# Patient Record
Sex: FEMALE | Race: WHITE | Employment: FULL TIME | URBAN - METROPOLITAN AREA
[De-identification: names, ages, dates, MRNs, and addresses within clinical notes are randomized per-mention and may not be internally consistent; named-entity substitution may affect disease eponyms.]

---

## 2019-10-10 ENCOUNTER — HOSPITAL ENCOUNTER (OUTPATIENT)
Dept: MAMMOGRAPHY | Age: 39
Discharge: HOME | End: 2019-10-10
Payer: COMMERCIAL

## 2019-10-10 DIAGNOSIS — Z12.31 SCREENING MAMMOGRAM, ENCOUNTER FOR: ICD-10-CM

## 2019-10-10 PROCEDURE — 77063 BREAST TOMOSYNTHESIS BI: CPT

## 2020-12-29 ENCOUNTER — HOSPITAL ENCOUNTER (OUTPATIENT)
Dept: MAMMOGRAPHY | Age: 40
Discharge: HOME | End: 2020-12-29
Payer: COMMERCIAL

## 2020-12-29 PROCEDURE — 77063 BREAST TOMOSYNTHESIS BI: CPT

## 2021-01-12 ENCOUNTER — HOSPITAL ENCOUNTER (OUTPATIENT)
Dept: WOMENS IMAGING | Age: 41
Discharge: HOME | End: 2021-01-12
Payer: COMMERCIAL

## 2021-01-12 DIAGNOSIS — R92.8 ABNORMAL MAMMOGRAM: ICD-10-CM

## 2021-01-12 PROCEDURE — 77065 DX MAMMO INCL CAD UNI: CPT

## 2021-01-12 PROCEDURE — 76642 ULTRASOUND BREAST LIMITED: CPT

## 2022-01-06 ENCOUNTER — HOSPITAL ENCOUNTER (OUTPATIENT)
Dept: MAMMOGRAPHY | Age: 42
Discharge: HOME | End: 2022-01-06
Payer: COMMERCIAL

## 2022-01-06 DIAGNOSIS — Z12.31 BREAST CANCER SCREENING BY MAMMOGRAM: ICD-10-CM

## 2022-01-06 PROCEDURE — 77063 BREAST TOMOSYNTHESIS BI: CPT

## 2022-01-27 ENCOUNTER — HOSPITAL ENCOUNTER (OUTPATIENT)
Dept: WOMENS IMAGING | Age: 42
Discharge: HOME | End: 2022-01-27
Payer: COMMERCIAL

## 2022-01-27 DIAGNOSIS — R92.8 ABNORMAL MAMMOGRAM: ICD-10-CM

## 2022-01-27 PROCEDURE — 76642 ULTRASOUND BREAST LIMITED: CPT

## 2022-01-27 PROCEDURE — G0279 TOMOSYNTHESIS, MAMMO: HCPCS

## 2022-02-01 ENCOUNTER — OFFICE VISIT (OUTPATIENT)
Dept: OBGYN CLINIC | Age: 42
End: 2022-02-01
Payer: COMMERCIAL

## 2022-02-01 VITALS
HEART RATE: 66 BPM | DIASTOLIC BLOOD PRESSURE: 70 MMHG | SYSTOLIC BLOOD PRESSURE: 106 MMHG | WEIGHT: 214 LBS | TEMPERATURE: 97.8 F

## 2022-02-01 DIAGNOSIS — Z12.4 PAP SMEAR FOR CERVICAL CANCER SCREENING: ICD-10-CM

## 2022-02-01 DIAGNOSIS — N92.6 IRREGULAR BLEEDING: ICD-10-CM

## 2022-02-01 DIAGNOSIS — Z01.419 ENCOUNTER FOR ANNUAL ROUTINE GYNECOLOGICAL EXAMINATION: Primary | ICD-10-CM

## 2022-02-01 LAB
BASOPHILS ABSOLUTE: 0 K/UL (ref 0–0.2)
BASOPHILS RELATIVE PERCENT: 0.3 %
EOSINOPHILS ABSOLUTE: 0.1 K/UL (ref 0–0.6)
EOSINOPHILS RELATIVE PERCENT: 1.3 %
HCT VFR BLD CALC: 41.9 % (ref 36–48)
HEMOGLOBIN: 14.1 G/DL (ref 12–16)
LYMPHOCYTES ABSOLUTE: 2.6 K/UL (ref 1–5.1)
LYMPHOCYTES RELATIVE PERCENT: 40.7 %
MCH RBC QN AUTO: 29.9 PG (ref 26–34)
MCHC RBC AUTO-ENTMCNC: 33.6 G/DL (ref 31–36)
MCV RBC AUTO: 89 FL (ref 80–100)
MONOCYTES ABSOLUTE: 0.4 K/UL (ref 0–1.3)
MONOCYTES RELATIVE PERCENT: 6.8 %
NEUTROPHILS ABSOLUTE: 3.2 K/UL (ref 1.7–7.7)
NEUTROPHILS RELATIVE PERCENT: 50.9 %
PDW BLD-RTO: 13.5 % (ref 12.4–15.4)
PLATELET # BLD: 192 K/UL (ref 135–450)
PMV BLD AUTO: 9.8 FL (ref 5–10.5)
RBC # BLD: 4.71 M/UL (ref 4–5.2)
WBC # BLD: 6.4 K/UL (ref 4–11)

## 2022-02-01 PROCEDURE — G8484 FLU IMMUNIZE NO ADMIN: HCPCS | Performed by: OBSTETRICS & GYNECOLOGY

## 2022-02-01 PROCEDURE — 99386 PREV VISIT NEW AGE 40-64: CPT | Performed by: OBSTETRICS & GYNECOLOGY

## 2022-02-01 PROCEDURE — 36415 COLL VENOUS BLD VENIPUNCTURE: CPT | Performed by: OBSTETRICS & GYNECOLOGY

## 2022-02-01 RX ORDER — LEVOTHYROXINE SODIUM 175 UG/1
175 TABLET ORAL DAILY
COMMUNITY

## 2022-02-01 RX ORDER — M-VIT,TX,IRON,MINS/CALC/FOLIC 27MG-0.4MG
1 TABLET ORAL DAILY
COMMUNITY

## 2022-02-01 RX ORDER — B-COMPLEX WITH VITAMIN C
TABLET ORAL DAILY
COMMUNITY

## 2022-02-01 RX ORDER — ESCITALOPRAM OXALATE 5 MG/1
5 TABLET ORAL DAILY
COMMUNITY

## 2022-02-01 RX ORDER — ASCORBIC ACID 500 MG
500 TABLET ORAL DAILY
COMMUNITY

## 2022-02-01 RX ORDER — FENOFIBRATE 48 MG/1
48 TABLET, COATED ORAL DAILY
COMMUNITY

## 2022-02-01 RX ORDER — CHLORAL HYDRATE 500 MG
3000 CAPSULE ORAL 3 TIMES DAILY
COMMUNITY

## 2022-02-01 ASSESSMENT — ENCOUNTER SYMPTOMS
CONSTIPATION: 0
SHORTNESS OF BREATH: 0
ABDOMINAL PAIN: 0
VOMITING: 0
DIARRHEA: 0
NAUSEA: 0

## 2022-02-01 NOTE — PROGRESS NOTES
Annual Exam      CC:   Chief Complaint   Patient presents with    New Patient       HPI:  35 y.o. [id number] presents for her gynecologic annual exam.     Has been having some issues with her periods. Over the last few months started having occasional hot flashes, and after that started noticing her periods were a little off. Had a period in November, December had them twice, no period in January, getting ready to start one now she thinks (breast tenderness). No vaginal dryness, no dyspareunia. Prior to this periods were pretty consistent once/month. Unsure when mother and older sister went through menopause, thinks maybe around 48 for mom. Patient seen and examined. Screening:  Last pap smear: 3/2019 NILM  Mammogram: 2022, birads 2 (had US)  Colonoscopy: n/a  DEXA scan: n/a    Review of Systems:   Review of Systems   Constitutional: Positive for diaphoresis. Negative for chills and fever. Respiratory: Negative for shortness of breath. Cardiovascular: Negative for chest pain. Gastrointestinal: Negative for abdominal pain, constipation, diarrhea, nausea and vomiting. Genitourinary: Positive for menstrual problem. Negative for difficulty urinating, dysuria, vaginal bleeding and vaginal discharge. Neurological: Negative for headaches.        Primary Care Physician: Chuyita Mcwilliams DO    Obstetric History  OB History    Para Term  AB Living   4 2 1 1 2 2   SAB IAB Ectopic Molar Multiple Live Births   2         2      # Outcome Date GA Lbr Srdihar/2nd Weight Sex Delivery Anes PTL Lv   4 2010           3 Term     M  EPI  TRAE   2   34w0d  6 lb (2.722 kg) F Vag-Spont EPI  TRAE   1 1996               Gynecologic History  Menstrual History:   LMP: see above   Menstrual pattern: see above  Sexual History:   Contraception: s/p BTL, vasectomy   Currently is sexually active   Denies history of STIs   No sexual problems  Pap History:   Last pap smear: 3/2019, NILM   History of abnormal pap smears: 1996, mild dysplasia, had colposcopy and normal after that    Medical History:  Past Medical History:   Diagnosis Date    Abnormal Pap smear of cervix     Autoimmune disorder (HCC)     Breast disorder     Menopausal symptoms     Postpartum depression     Psychiatric problem     anxiety    Thyroid disease        Surgical History:  Past Surgical History:   Procedure Laterality Date    DILATION AND CURETTAGE         Medications:  Current Outpatient Medications   Medication Sig Dispense Refill    Multiple Vitamins-Minerals (THERAPEUTIC MULTIVITAMIN-MINERALS) tablet Take 1 tablet by mouth daily      Zinc 100 MG TABS Take by mouth daily      levothyroxine (SYNTHROID) 175 MCG tablet Take 175 mcg by mouth Daily      fenofibrate (TRICOR) 48 MG tablet Take 48 mg by mouth daily      escitalopram (LEXAPRO) 5 MG tablet Take 5 mg by mouth daily      vitamin C (ASCORBIC ACID) 500 MG tablet Take 500 mg by mouth daily      Cholecalciferol (VITAMIN D3) 125 MCG (5000 UT) TABS Take by mouth daily      Omega-3 Fatty Acids (FISH OIL) 1000 MG CAPS Take 3,000 mg by mouth 3 times daily       No current facility-administered medications for this visit. Allergies:   Allergies   Allergen Reactions    Zoloft [Sertraline]        Social History:  Social History     Socioeconomic History    Marital status:      Spouse name: None    Number of children: None    Years of education: None    Highest education level: None   Occupational History    None   Tobacco Use    Smoking status: Current Every Day Smoker     Packs/day: 0.50    Smokeless tobacco: Never Used   Vaping Use    Vaping Use: Never used   Substance and Sexual Activity    Alcohol use: Not Currently    Drug use: Not Currently    Sexual activity: None   Other Topics Concern    None   Social History Narrative    None     Social Determinants of Health     Financial Resource Strain:     Difficulty of Paying Living nipple discharge or skin change. Left: Normal. No mass, nipple discharge or skin change. Abdominal:      General: There is no distension. Palpations: Abdomen is soft. Tenderness: There is no abdominal tenderness. There is no guarding or rebound. Genitourinary:     Comments: Pelvic exam: VULVA: normal appearing vulva with no masses, tenderness or lesions, VAGINA: normal appearing vagina with normal color and discharge, no lesions, CERVIX: normal appearing cervix without discharge or lesions, UTERUS: uterus is normal size, shape, consistency and nontender, ADNEXA: normal adnexa in size, nontender and no masses. Musculoskeletal:      Cervical back: Normal range of motion. Neurological:      Mental Status: She is alert and oriented to person, place, and time. Assessment/Plan:  35 y.o. [id number] presenting for her annual exam:    1. Encounter for annual routine gynecological examination  Discussed age appropriate screening recommendations, pap smear sent today and mammogram up-to-date. Discussed breast self awareness, STI screening deferred. - PAP SMEAR    2. Pap smear for cervical cancer screening  - PAP SMEAR    3. Irregular bleeding  Patient with irregular bleeding as above, discussed possible perimenopausal bleeding with associated symptoms however also will send below labs as patient has history of thyroid disease. Reviewed return precautions, all questions answered.   - CBC Auto Differential  - Estradiol  - Prolactin  - TSH with Reflex  - Follicle Stimulating Hormone      Dispo: PRN or for annual exam  Luke Mcburney, MD

## 2022-02-02 LAB
ESTRADIOL LEVEL: 23 PG/ML
FOLLICLE STIMULATING HORMONE: 6.7 MIU/ML
PROLACTIN: 12.5 NG/ML
T4 FREE: 1.8 NG/DL (ref 0.9–1.8)
TSH REFLEX: 0.13 UIU/ML (ref 0.27–4.2)

## 2022-02-04 LAB
HPV COMMENT: NORMAL
HPV TYPE 16: NOT DETECTED
HPV TYPE 18: NOT DETECTED
HPVOH (OTHER TYPES): NOT DETECTED

## 2023-01-24 ENCOUNTER — HOSPITAL ENCOUNTER (OUTPATIENT)
Dept: MAMMOGRAPHY | Age: 43
Discharge: HOME | End: 2023-01-24
Payer: COMMERCIAL

## 2023-01-24 DIAGNOSIS — Z12.31 BREAST CANCER SCREENING BY MAMMOGRAM: ICD-10-CM

## 2023-01-24 PROCEDURE — 77067 SCR MAMMO BI INCL CAD: CPT

## 2023-04-04 ENCOUNTER — OFFICE VISIT (OUTPATIENT)
Dept: OBGYN CLINIC | Age: 43
End: 2023-04-04
Payer: COMMERCIAL

## 2023-04-04 VITALS
WEIGHT: 184.8 LBS | HEART RATE: 56 BPM | TEMPERATURE: 97.6 F | DIASTOLIC BLOOD PRESSURE: 72 MMHG | SYSTOLIC BLOOD PRESSURE: 114 MMHG

## 2023-04-04 DIAGNOSIS — Z01.419 ENCOUNTER FOR ANNUAL ROUTINE GYNECOLOGICAL EXAMINATION: Primary | ICD-10-CM

## 2023-04-04 DIAGNOSIS — N91.4 SECONDARY OLIGOMENORRHEA: ICD-10-CM

## 2023-04-04 PROCEDURE — 99396 PREV VISIT EST AGE 40-64: CPT | Performed by: OBSTETRICS & GYNECOLOGY

## 2023-04-04 ASSESSMENT — ENCOUNTER SYMPTOMS
NAUSEA: 0
ABDOMINAL PAIN: 0
DIARRHEA: 0
VOMITING: 0
SHORTNESS OF BREATH: 0
CONSTIPATION: 0

## 2023-04-04 NOTE — PROGRESS NOTES
Annual Exam      CC:   Chief Complaint   Patient presents with    Annual Exam       HPI:  38 y.o. [id number] presents for her gynecologic annual exam.    Periods have remained irregular, Dec 29-, then -. Nothing since that time. Did have a medication adjustment to her synthroid about 2 months ago. No menopause symptoms at this time, no hot flashes or dryness. Patient seen and examined. Screening:  Last pap smear: 22 NILM  Mammogram: 23 birads 1  Colonoscopy: n/a  DEXA scan: n/a    Review of Systems:   Review of Systems   Respiratory:  Negative for shortness of breath. Cardiovascular:  Negative for chest pain. Gastrointestinal:  Negative for abdominal pain, constipation, diarrhea, nausea and vomiting. Genitourinary:  Positive for menstrual problem. Negative for difficulty urinating, dysuria, vaginal bleeding and vaginal discharge. Neurological:  Negative for headaches.      Primary Care Physician: Gissell Gonzalez DO    Obstetric History  OB History    Para Term  AB Living   5 3 2 1 2 2   SAB IAB Ectopic Molar Multiple Live Births   2         2      # Outcome Date GA Lbr Sridhar/2nd Weight Sex Delivery Anes PTL Lv   5 2010           4 Term     M  EPI  TRAE   3   34w0d  6 lb (2.722 kg) F Vag-Spont EPI  TRAE   2 SAB            1 Term                Gynecologic History  Menstrual History:  LMP: see above  Menstrual pattern: see above  Sexual History:  Contraception: s/p BTL, vasectomy  Currently is sexually active  Denies history of STIs  No sexual problems  Pap History:  Last pap smear: 22 NILM  History of abnormal pap smears: , mild dysplasia, had colposcopy and normal after that    Medical History:  Past Medical History:   Diagnosis Date    Abnormal Pap smear of cervix     Autoimmune disorder ([facility] [address]     Breast disorder     Menopausal symptoms     Postpartum depression     Psychiatric problem     anxiety    Thyroid disease        Surgical

## 2023-05-08 ENCOUNTER — OFFICE VISIT (OUTPATIENT)
Dept: OBGYN CLINIC | Age: 43
End: 2023-05-08
Payer: COMMERCIAL

## 2023-05-08 VITALS
WEIGHT: 172.2 LBS | HEART RATE: 66 BPM | TEMPERATURE: 99 F | DIASTOLIC BLOOD PRESSURE: 76 MMHG | SYSTOLIC BLOOD PRESSURE: 112 MMHG

## 2023-05-08 DIAGNOSIS — N91.4 SECONDARY OLIGOMENORRHEA: Primary | ICD-10-CM

## 2023-05-08 PROCEDURE — 4004F PT TOBACCO SCREEN RCVD TLK: CPT | Performed by: OBSTETRICS & GYNECOLOGY

## 2023-05-08 PROCEDURE — G8421 BMI NOT CALCULATED: HCPCS | Performed by: OBSTETRICS & GYNECOLOGY

## 2023-05-08 PROCEDURE — 99213 OFFICE O/P EST LOW 20 MIN: CPT | Performed by: OBSTETRICS & GYNECOLOGY

## 2023-05-08 PROCEDURE — G8427 DOCREV CUR MEDS BY ELIG CLIN: HCPCS | Performed by: OBSTETRICS & GYNECOLOGY

## 2023-05-08 NOTE — PROGRESS NOTES
Return Gyn Office Visit    CC:   Chief Complaint   Patient presents with    Follow-up       HPI:  38 y.o. [id number] who presents to office for follow-up of secondary oligomenorrhea. Seen in April 2023 for annual exam, at that time was reporting irregular menses. Labs last year wnl, here for f/u with US. Since last visit did have 2 periods 2 weeks apart, prior to that however had been several months between menses. Objective:  /76 (Site: Left Upper Arm, Position: Sitting, Cuff Size: Medium Adult)   Pulse 66   Temp 99 °F (37.2 °C) (Infrared)   Wt 172 lb 3.2 oz (78.1 kg)   Physical Exam  HENT:      Head: Normocephalic. Cardiovascular:      Rate and Rhythm: Normal rate. Pulmonary:      Effort: Pulmonary effort is normal. No respiratory distress. Abdominal:      Palpations: Abdomen is soft. Tenderness: There is no abdominal tenderness. Neurological:      Mental Status: She is alert. Psychiatric:         Mood and Affect: Mood normal.       Imaging:   US NON OB TRANSVAGINAL  Order: [id number]  Status: Final result     Visible to patient: No (not released)     Next appt: None     Dx: Secondary amenorrhea     0 Result Notes  Details    Reading Physician Reading Date Result Priority   Mitchell Owens MD  [phone] 5/8/2023 Routine     Narrative & Impression  PELVIC ULTRASOUND without DOPPLER INTERROGATION   NON OB     DATE: 05/08/2023     PHYSICIAN: Alexx Jenkins M.D.      SONOGRAPHER: Thao Gomez [facility]     INDICATION: secondary amenorrhea     TYPE OF SCAN: vaginal     FINDINGS:    The cul de sac is normal. No free fluid appreciated. The cervix is normal and not enlarged. Nabothian cyst/s is not noted within the uterine cervix. The uterus measures 8.53 cm x 4.89 cm x 4.00 cm. The uterus is anteverted. The endometrium measures 3.55 mm. The myometrium is homogeneous in appearance. No uterine anomalies are noted. The right ovary is present.   The right ovary measures 4.12 cm x 1.84 cm x 1.64

## 2023-07-21 ENCOUNTER — HOSPITAL ENCOUNTER (OUTPATIENT)
Dept: GENERAL RADIOLOGY | Age: 43
Discharge: HOME | End: 2023-07-21
Payer: COMMERCIAL

## 2023-07-21 DIAGNOSIS — R06.2 WHEEZING: ICD-10-CM

## 2023-07-21 PROCEDURE — 71046 X-RAY EXAM CHEST 2 VIEWS: CPT

## 2024-02-20 ENCOUNTER — HOSPITAL ENCOUNTER (OUTPATIENT)
Dept: MAMMOGRAPHY | Age: 44
Discharge: HOME | End: 2024-02-20
Payer: COMMERCIAL

## 2024-02-20 VITALS — BODY MASS INDEX: 25.11 KG/M2 | HEIGHT: 67 IN | WEIGHT: 160 LBS

## 2024-02-20 DIAGNOSIS — Z12.31 ENCOUNTER FOR SCREENING MAMMOGRAM FOR BREAST CANCER: ICD-10-CM

## 2024-02-20 PROCEDURE — 77063 BREAST TOMOSYNTHESIS BI: CPT

## 2024-12-03 ENCOUNTER — OFFICE VISIT (OUTPATIENT)
Dept: ENDOCRINOLOGY | Age: 44
End: 2024-12-03
Payer: COMMERCIAL

## 2024-12-03 VITALS
BODY MASS INDEX: 29.51 KG/M2 | HEART RATE: 76 BPM | WEIGHT: 188 LBS | SYSTOLIC BLOOD PRESSURE: 121 MMHG | HEIGHT: 67 IN | DIASTOLIC BLOOD PRESSURE: 72 MMHG

## 2024-12-03 DIAGNOSIS — G47.30 SLEEP APNEA IN ADULT: ICD-10-CM

## 2024-12-03 DIAGNOSIS — E03.9 HYPOTHYROIDISM (ACQUIRED): Primary | ICD-10-CM

## 2024-12-03 DIAGNOSIS — K58.8 OTHER IRRITABLE BOWEL SYNDROME: ICD-10-CM

## 2024-12-03 DIAGNOSIS — M79.89 BILATERAL HAND SWELLING: ICD-10-CM

## 2024-12-03 DIAGNOSIS — E03.9 HYPOTHYROIDISM (ACQUIRED): ICD-10-CM

## 2024-12-03 LAB
IGA SERPL-MCNC: 205 MG/DL (ref 70–400)
T4 FREE SERPL-MCNC: 1.2 NG/DL (ref 0.9–1.8)
THYROPEROXIDASE AB SERPL IA-ACNC: 32 IU/ML
TSH SERPL DL<=0.005 MIU/L-ACNC: 9.29 UIU/ML (ref 0.27–4.2)

## 2024-12-03 PROCEDURE — G8427 DOCREV CUR MEDS BY ELIG CLIN: HCPCS | Performed by: INTERNAL MEDICINE

## 2024-12-03 PROCEDURE — 99204 OFFICE O/P NEW MOD 45 MIN: CPT | Performed by: INTERNAL MEDICINE

## 2024-12-03 PROCEDURE — G2211 COMPLEX E/M VISIT ADD ON: HCPCS | Performed by: INTERNAL MEDICINE

## 2024-12-03 PROCEDURE — G8419 CALC BMI OUT NRM PARAM NOF/U: HCPCS | Performed by: INTERNAL MEDICINE

## 2024-12-03 PROCEDURE — 4004F PT TOBACCO SCREEN RCVD TLK: CPT | Performed by: INTERNAL MEDICINE

## 2024-12-03 PROCEDURE — G8484 FLU IMMUNIZE NO ADMIN: HCPCS | Performed by: INTERNAL MEDICINE

## 2024-12-03 RX ORDER — BUSPIRONE HYDROCHLORIDE 5 MG/1
TABLET ORAL
COMMUNITY
Start: 2024-10-31

## 2024-12-03 RX ORDER — LEVOTHYROXINE SODIUM 200 UG/1
200 TABLET ORAL DAILY
COMMUNITY

## 2024-12-03 NOTE — PATIENT INSTRUCTIONS
I advise patients to take levothyroxine pill every morning on an empty stomach and wait an 30-60 minutes before eating.     I advise patient to space any calcium or iron supplements 4 hours from levothyroxine intake.     I advise patient that if one pill is missed to take 2 pills the next morning on an empty stomach. (7 pills in 7 days)    Please review the following websites regarding thyroid diseases:    [email or web address]    [email or web address]

## 2024-12-03 NOTE — PROGRESS NOTES
[facility] Endocrinology        Chief Complaint   Patient presents with    New Patient    Thyroid Problem       Ghazala Cárdenas is a pleasant 43 y.o. year old female here to establish care for hypothyroidism.  She otherwise had been diagnosed with IBS in the past and has a BMI of 29.  She reports being diagnosed with hypothyroidism around 2003.  She has family history of hypothyroidism in both mother and daughter.    She been on various doses of levothyroxine.  At some point she was switched to Synthroid brand name to improve levels and symptoms.  She had been on 200 mcg daily over the past year.  She denies any palpitations or tremors.  She reports that she is always tired.  She needs to drink lots of coffee to stay awake in the morning.  Recently she had been staying at her daughter's place to help take care of grandkids.  Daughter did comment that her mother had snoring as well as apneic episodes.  She reports occasional morning headaches.    She recently lost weight.  She used to weight 250 lbs around 2021. She lost weight about 100 lbs with diet changes following WW. She also attributes that to stress.     She reports being diagnosed with IBS. No GI issues now.  Daughter has diagnosis of celiac disease.    She lives with her , she smokes about a pack per day.  She drinks alcohol occasionally.         ALLERGIES:  Allergies   Allergen Reactions    Zoloft [Sertraline]         MEDICATIONS:  Outpatient Medications Marked as Taking for the 12/3/24 encounter (Office Visit) with Grecia Willis MD   Medication Sig Dispense Refill    busPIRone (BUSPAR) 5 MG tablet       levothyroxine (SYNTHROID) 200 MCG tablet Take 1 tablet by mouth Daily      Multiple Vitamins-Minerals (THERAPEUTIC MULTIVITAMIN-MINERALS) tablet Take 1 tablet by mouth daily      Zinc 100 MG TABS Take by mouth daily      fenofibrate (TRICOR) 48 MG tablet Take 1 tablet by mouth daily      escitalopram (LEXAPRO) 5 MG tablet Take 1 tablet by mouth daily

## 2024-12-04 DIAGNOSIS — E03.9 HYPOTHYROIDISM (ACQUIRED): Primary | ICD-10-CM

## 2024-12-04 LAB — TISSUE TRANSGLUTAMINASE IGA: <0.5 U/ML (ref 0–14)

## 2024-12-04 RX ORDER — LEVOTHYROXINE SODIUM 25 UG/1
25 TABLET ORAL DAILY
Qty: 90 TABLET | Refills: 1 | Status: SHIPPED | OUTPATIENT
Start: 2024-12-04 | End: 2024-12-04

## 2024-12-04 RX ORDER — LEVOTHYROXINE SODIUM 25 UG/1
25 TABLET ORAL DAILY
Qty: 90 TABLET | Refills: 1 | Status: SHIPPED | OUTPATIENT
Start: 2024-12-04 | End: 2024-12-06 | Stop reason: SDUPTHER

## 2024-12-05 LAB
IGF-I SERPL-MCNC: 201 NG/ML (ref 66–249)
IGF-I Z-SCORE SERPL: 1.1

## 2024-12-06 ENCOUNTER — TELEPHONE (OUTPATIENT)
Dept: ENDOCRINOLOGY | Age: 44
End: 2024-12-06

## 2024-12-06 DIAGNOSIS — E03.9 HYPOTHYROIDISM (ACQUIRED): ICD-10-CM

## 2024-12-06 RX ORDER — LEVOTHYROXINE SODIUM 25 UG/1
25 TABLET ORAL DAILY
Qty: 90 TABLET | Refills: 1 | Status: SHIPPED | OUTPATIENT
Start: 2024-12-06

## 2024-12-06 NOTE — TELEPHONE ENCOUNTER
Spoke to patient regarding lab results she voices understanding 25 mg levothyroxine sent to [facility].

## 2025-02-25 ENCOUNTER — HOSPITAL ENCOUNTER (OUTPATIENT)
Dept: MAMMOGRAPHY | Age: 45
Discharge: HOME | End: 2025-02-25
Payer: COMMERCIAL

## 2025-02-25 VITALS — BODY MASS INDEX: 29.03 KG/M2 | WEIGHT: 185 LBS | HEIGHT: 67 IN

## 2025-02-25 DIAGNOSIS — Z12.31 ENCOUNTER FOR SCREENING MAMMOGRAM FOR BREAST CANCER: ICD-10-CM

## 2025-02-25 PROCEDURE — 77067 SCR MAMMO BI INCL CAD: CPT
